# Patient Record
Sex: MALE | Race: OTHER | HISPANIC OR LATINO | URBAN - METROPOLITAN AREA
[De-identification: names, ages, dates, MRNs, and addresses within clinical notes are randomized per-mention and may not be internally consistent; named-entity substitution may affect disease eponyms.]

---

## 2017-02-08 ENCOUNTER — EMERGENCY (EMERGENCY)
Facility: HOSPITAL | Age: 27
LOS: 1 days | Discharge: HOME | End: 2017-02-08
Attending: EMERGENCY MEDICINE
Payer: SELF-PAY

## 2017-02-08 VITALS
RESPIRATION RATE: 19 BRPM | HEART RATE: 82 BPM | DIASTOLIC BLOOD PRESSURE: 70 MMHG | WEIGHT: 216.05 LBS | SYSTOLIC BLOOD PRESSURE: 122 MMHG | OXYGEN SATURATION: 98 % | TEMPERATURE: 99 F

## 2017-02-08 DIAGNOSIS — J40 BRONCHITIS, NOT SPECIFIED AS ACUTE OR CHRONIC: ICD-10-CM

## 2017-02-08 DIAGNOSIS — J18.9 PNEUMONIA, UNSPECIFIED ORGANISM: ICD-10-CM

## 2017-02-08 PROCEDURE — 94640 AIRWAY INHALATION TREATMENT: CPT

## 2017-02-08 PROCEDURE — 71046 X-RAY EXAM CHEST 2 VIEWS: CPT

## 2017-02-08 PROCEDURE — 99053 MED SERV 10PM-8AM 24 HR FAC: CPT

## 2017-02-08 PROCEDURE — 99284 EMERGENCY DEPT VISIT MOD MDM: CPT | Mod: 25

## 2017-02-08 PROCEDURE — 99283 EMERGENCY DEPT VISIT LOW MDM: CPT | Mod: 25

## 2017-02-08 PROCEDURE — 71020: CPT | Mod: 26

## 2017-02-08 RX ORDER — IPRATROPIUM/ALBUTEROL SULFATE 18-103MCG
3 AEROSOL WITH ADAPTER (GRAM) INHALATION ONCE
Qty: 0 | Refills: 0 | Status: COMPLETED | OUTPATIENT
Start: 2017-02-08 | End: 2017-02-08

## 2017-02-08 RX ORDER — ALBUTEROL 90 UG/1
0 AEROSOL, METERED ORAL
Qty: 0 | Refills: 0 | COMMUNITY

## 2017-02-08 RX ORDER — ALBUTEROL 90 UG/1
2 AEROSOL, METERED ORAL
Qty: 2 | Refills: 0 | OUTPATIENT
Start: 2017-02-08 | End: 2017-02-18

## 2017-02-08 RX ADMIN — Medication 3 MILLILITER(S): at 03:02

## 2017-02-08 RX ADMIN — Medication 60 MILLIGRAM(S): at 03:02

## 2017-02-08 NOTE — ED PROVIDER NOTE - OBJECTIVE STATEMENT
27 y/o M pt w/ no significant PMHx presents to the ED c/o chest congestion, wheezing and cough x3 weeks. Pt states that he went to his PMD and was given Prednisone and Zithromax; pt has been taking them the past 3 weeks to mild relief. Pt denies fever, headache, hemoptysis, diaphoresis, or any other complaints. NKDA.

## 2017-02-08 NOTE — ED PROVIDER NOTE - MEDICAL DECISION MAKING DETAILS
25 y/o M pt w/ bronchitis and PNA. Pt already completed course of steroids and Zithromax. Will give another dose of steroids and change antibiotic to Levaquin. Pt instructed to f/u w/ PMD and return to ED for worsening symptoms.

## 2017-02-08 NOTE — ED PROVIDER NOTE - NS ED MD SCRIBE ATTENDING SCRIBE SECTIONS
VITAL SIGNS( Pullset)/PAST MEDICAL/SURGICAL/SOCIAL HISTORY/HISTORY OF PRESENT ILLNESS/DISPOSITION/REVIEW OF SYSTEMS/PHYSICAL EXAM/HIV

## 2020-08-14 ENCOUNTER — EMERGENCY (EMERGENCY)
Facility: HOSPITAL | Age: 30
LOS: 1 days | Discharge: HOME | End: 2020-08-14
Attending: EMERGENCY MEDICINE
Payer: SELF-PAY

## 2020-08-14 VITALS
HEART RATE: 80 BPM | TEMPERATURE: 99 F | DIASTOLIC BLOOD PRESSURE: 84 MMHG | RESPIRATION RATE: 18 BRPM | WEIGHT: 239.42 LBS | OXYGEN SATURATION: 97 % | SYSTOLIC BLOOD PRESSURE: 148 MMHG | HEIGHT: 70.87 IN

## 2020-08-14 PROCEDURE — 70486 CT MAXILLOFACIAL W/O DYE: CPT | Mod: 26

## 2020-08-14 PROCEDURE — 99284 EMERGENCY DEPT VISIT MOD MDM: CPT | Mod: 25

## 2020-08-14 PROCEDURE — 70486 CT MAXILLOFACIAL W/O DYE: CPT

## 2020-08-14 PROCEDURE — 70450 CT HEAD/BRAIN W/O DYE: CPT | Mod: 26

## 2020-08-14 PROCEDURE — 70450 CT HEAD/BRAIN W/O DYE: CPT

## 2020-08-14 RX ORDER — OXYCODONE AND ACETAMINOPHEN 5; 325 MG/1; MG/1
1 TABLET ORAL ONCE
Refills: 0 | Status: DISCONTINUED | OUTPATIENT
Start: 2020-08-14 | End: 2020-08-14

## 2020-08-14 RX ADMIN — OXYCODONE AND ACETAMINOPHEN 1 TABLET(S): 5; 325 TABLET ORAL at 22:14

## 2020-08-14 NOTE — ED PROVIDER NOTE - NSFOLLOWUPINSTRUCTIONS_ED_ALL_ED_FT
Contusion    A contusion is a deep bruise. Contusions are the result of a blunt injury to tissues and muscle fibers under the skin. The skin overlying the contusion may turn blue, purple, or yellow. Symptoms also include pain and swelling in the injured area.    SEEK IMMEDIATE MEDICAL CARE IF YOU HAVE ANY OF THE FOLLOWING SYMPTOMS: severe pain, numbness, tingling, pain, weakness, worsening vision, trouble moving your eye or any other worrisome or concerning signs or symptoms.

## 2020-08-14 NOTE — ED PROVIDER NOTE - PROGRESS NOTE DETAILS
No corneal abrasion on exam with woods lamp and fluorescin stain.  CTs negative.  Will provide patient with analgesia and discharge.  Patient nontoxic and medically stable for discharge. Results discussed with patient. Return precautions provided and patient understands to return to the ED for concerning or worsening signs and symptoms. Instructed to follow up with primary care physician and agreeable. Patient's questions answered.

## 2020-08-14 NOTE — ED ADULT TRIAGE NOTE - CHIEF COMPLAINT QUOTE
SENT BY PMD FOR S/P BLUNT TRAUMA TO RIGHT EYE. PT REPORTS S/P GOT HIT IN RIGHT EYE BY ELBOW. + BLURRED VISION AND PAIN

## 2020-08-14 NOTE — ED PROVIDER NOTE - PATIENT PORTAL LINK FT
You can access the FollowMyHealth Patient Portal offered by [facility] by registering at the following website: [email or web address]. By joining [employer]’s FollowMyHealth portal, you will also be able to view your health information using other applications (apps) compatible with our system.

## 2020-08-14 NOTE — ED ADULT NURSE NOTE - OBJECTIVE STATEMENT
States he was accidentally elbowed playing basketball today ,c/o right eye pain and blurring of vision . States he was accidentally elbowed playing basketball today ,c/o right eye pain and blurring of vision .Right eye with subconjunctival hemorrhage .,upper eyelid with 1.5 cm laceration closed with hystacryl by Keyla ROBERT. With right ace orbital ecchymosis .

## 2020-08-14 NOTE — ED PROVIDER NOTE - ATTENDING CONTRIBUTION TO CARE
seen with acp  elbowed when playing basketball 1 hour pta  PE laceration to right corner of eye  visual acuity 20/30  no corneal abrasion  ct scan is negative  Agree with acps assessment hx and physical and disposition

## 2020-08-14 NOTE — ED PROVIDER NOTE - EYE, RIGHT
pupils equal, round, and reactive to light/Periorbital ecchymosis, soft tissue swelling to the lateral superior aspect of the R eye

## 2022-12-24 ENCOUNTER — EMERGENCY (EMERGENCY)
Facility: HOSPITAL | Age: 32
LOS: 1 days | Discharge: ANOTHER HOSPITAL | End: 2022-12-24
Attending: EMERGENCY MEDICINE
Payer: COMMERCIAL

## 2022-12-24 ENCOUNTER — EMERGENCY (EMERGENCY)
Facility: HOSPITAL | Age: 32
LOS: 1 days | Discharge: HOME | End: 2022-12-24
Attending: EMERGENCY MEDICINE
Payer: COMMERCIAL

## 2022-12-24 VITALS
WEIGHT: 227.96 LBS | HEART RATE: 80 BPM | TEMPERATURE: 99 F | OXYGEN SATURATION: 96 % | SYSTOLIC BLOOD PRESSURE: 102 MMHG | DIASTOLIC BLOOD PRESSURE: 56 MMHG | RESPIRATION RATE: 20 BRPM | HEIGHT: 72 IN

## 2022-12-24 VITALS
RESPIRATION RATE: 20 BRPM | WEIGHT: 227.96 LBS | TEMPERATURE: 97 F | HEART RATE: 68 BPM | SYSTOLIC BLOOD PRESSURE: 90 MMHG | DIASTOLIC BLOOD PRESSURE: 55 MMHG | OXYGEN SATURATION: 98 %

## 2022-12-24 PROBLEM — Z78.9 OTHER SPECIFIED HEALTH STATUS: Chronic | Status: ACTIVE | Noted: 2020-08-14

## 2022-12-24 LAB
ALBUMIN SERPL ELPH-MCNC: 4.2 G/DL — SIGNIFICANT CHANGE UP (ref 3.5–5)
ALBUMIN SERPL ELPH-MCNC: 4.5 G/DL — SIGNIFICANT CHANGE UP (ref 3.3–5)
ALP SERPL-CCNC: 43 U/L — SIGNIFICANT CHANGE UP (ref 40–120)
ALP SERPL-CCNC: 44 U/L — SIGNIFICANT CHANGE UP (ref 40–120)
ALT FLD-CCNC: 18 U/L — SIGNIFICANT CHANGE UP (ref 10–45)
ALT FLD-CCNC: 27 U/L DA — SIGNIFICANT CHANGE UP (ref 10–60)
ANION GAP SERPL CALC-SCNC: 13 MMOL/L — SIGNIFICANT CHANGE UP (ref 5–17)
ANION GAP SERPL CALC-SCNC: 7 MMOL/L — SIGNIFICANT CHANGE UP (ref 5–17)
APAP SERPL-MCNC: <2 UG/ML — LOW (ref 10–30)
APTT BLD: 28.5 SEC — SIGNIFICANT CHANGE UP (ref 27.5–35.5)
AST SERPL-CCNC: 20 U/L — SIGNIFICANT CHANGE UP (ref 10–40)
BASE EXCESS BLDV CALC-SCNC: 0.4 MMOL/L — SIGNIFICANT CHANGE UP (ref -2–3)
BASOPHILS # BLD AUTO: 0 K/UL — SIGNIFICANT CHANGE UP (ref 0–0.2)
BASOPHILS # BLD AUTO: 0.07 K/UL — SIGNIFICANT CHANGE UP (ref 0–0.2)
BASOPHILS NFR BLD AUTO: 0 % — SIGNIFICANT CHANGE UP (ref 0–2)
BASOPHILS NFR BLD AUTO: 0.9 % — SIGNIFICANT CHANGE UP (ref 0–2)
BILIRUB SERPL-MCNC: 0.7 MG/DL — SIGNIFICANT CHANGE UP (ref 0.2–1.2)
BUN SERPL-MCNC: 20 MG/DL — SIGNIFICANT CHANGE UP (ref 7–23)
BUN SERPL-MCNC: 21 MG/DL — HIGH (ref 7–18)
BURR CELLS BLD QL SMEAR: PRESENT — SIGNIFICANT CHANGE UP
CA-I SERPL-SCNC: 1.21 MMOL/L — SIGNIFICANT CHANGE UP (ref 1.15–1.33)
CALCIUM SERPL-MCNC: 9 MG/DL — SIGNIFICANT CHANGE UP (ref 8.4–10.5)
CALCIUM SERPL-MCNC: 9.1 MG/DL — SIGNIFICANT CHANGE UP (ref 8.4–10.5)
CHLORIDE BLDV-SCNC: 102 MMOL/L — SIGNIFICANT CHANGE UP (ref 96–108)
CHLORIDE SERPL-SCNC: 103 MMOL/L — SIGNIFICANT CHANGE UP (ref 96–108)
CHLORIDE SERPL-SCNC: 106 MMOL/L — SIGNIFICANT CHANGE UP (ref 96–108)
CO2 BLDV-SCNC: 28 MMOL/L — HIGH (ref 22–26)
CO2 SERPL-SCNC: 23 MMOL/L — SIGNIFICANT CHANGE UP (ref 22–31)
CO2 SERPL-SCNC: 26 MMOL/L — SIGNIFICANT CHANGE UP (ref 22–31)
CREAT SERPL-MCNC: 1.02 MG/DL — SIGNIFICANT CHANGE UP (ref 0.5–1.3)
CREAT SERPL-MCNC: 1.16 MG/DL — SIGNIFICANT CHANGE UP (ref 0.5–1.3)
EGFR: 85 ML/MIN/1.73M2 — SIGNIFICANT CHANGE UP
EGFR: 99 ML/MIN/1.73M2 — SIGNIFICANT CHANGE UP
EOSINOPHIL # BLD AUTO: 0 K/UL — SIGNIFICANT CHANGE UP (ref 0–0.5)
EOSINOPHIL NFR BLD AUTO: 0 % — SIGNIFICANT CHANGE UP (ref 0–6)
FLUAV AG NPH QL: SIGNIFICANT CHANGE UP
FLUBV AG NPH QL: SIGNIFICANT CHANGE UP
GAS PNL BLDV: 133 MMOL/L — LOW (ref 136–145)
GAS PNL BLDV: SIGNIFICANT CHANGE UP
GLUCOSE BLDV-MCNC: 123 MG/DL — HIGH (ref 70–99)
GLUCOSE SERPL-MCNC: 127 MG/DL — HIGH (ref 70–99)
GLUCOSE SERPL-MCNC: 142 MG/DL — HIGH (ref 70–99)
HCO3 BLDV-SCNC: 27 MMOL/L — SIGNIFICANT CHANGE UP (ref 22–29)
HCT VFR BLD CALC: 47.1 % — SIGNIFICANT CHANGE UP (ref 39–50)
HCT VFR BLD CALC: 47.9 % — SIGNIFICANT CHANGE UP (ref 39–50)
HCT VFR BLDA CALC: 50 % — SIGNIFICANT CHANGE UP (ref 39–51)
HGB BLD CALC-MCNC: 16.6 G/DL — SIGNIFICANT CHANGE UP (ref 12.6–17.4)
HGB BLD-MCNC: 16.4 G/DL — SIGNIFICANT CHANGE UP (ref 13–17)
HGB BLD-MCNC: 16.7 G/DL — SIGNIFICANT CHANGE UP (ref 13–17)
INR BLD: 1.25 RATIO — HIGH (ref 0.88–1.16)
LACTATE BLDV-MCNC: 2 MMOL/L — SIGNIFICANT CHANGE UP (ref 0.5–2)
LYMPHOCYTES # BLD AUTO: 0.32 K/UL — LOW (ref 1–3.3)
LYMPHOCYTES # BLD AUTO: 0.42 K/UL — LOW (ref 1–3.3)
LYMPHOCYTES # BLD AUTO: 4 % — LOW (ref 13–44)
LYMPHOCYTES # BLD AUTO: 5.1 % — LOW (ref 13–44)
MAGNESIUM SERPL-MCNC: 2 MG/DL — SIGNIFICANT CHANGE UP (ref 1.6–2.6)
MANUAL SMEAR VERIFICATION: SIGNIFICANT CHANGE UP
MCHC RBC-ENTMCNC: 31.3 PG — SIGNIFICANT CHANGE UP (ref 27–34)
MCHC RBC-ENTMCNC: 31.4 PG — SIGNIFICANT CHANGE UP (ref 27–34)
MCHC RBC-ENTMCNC: 34.8 GM/DL — SIGNIFICANT CHANGE UP (ref 32–36)
MCHC RBC-ENTMCNC: 34.9 GM/DL — SIGNIFICANT CHANGE UP (ref 32–36)
MCV RBC AUTO: 89.9 FL — SIGNIFICANT CHANGE UP (ref 80–100)
MCV RBC AUTO: 90 FL — SIGNIFICANT CHANGE UP (ref 80–100)
MONOCYTES # BLD AUTO: 0.48 K/UL — SIGNIFICANT CHANGE UP (ref 0–0.9)
MONOCYTES # BLD AUTO: 0.83 K/UL — SIGNIFICANT CHANGE UP (ref 0–0.9)
MONOCYTES NFR BLD AUTO: 10.2 % — SIGNIFICANT CHANGE UP (ref 2–14)
MONOCYTES NFR BLD AUTO: 6 % — SIGNIFICANT CHANGE UP (ref 2–14)
NEUTROPHILS # BLD AUTO: 6.5 K/UL — SIGNIFICANT CHANGE UP (ref 1.8–7.4)
NEUTROPHILS # BLD AUTO: 7.24 K/UL — SIGNIFICANT CHANGE UP (ref 1.8–7.4)
NEUTROPHILS NFR BLD AUTO: 75.4 % — SIGNIFICANT CHANGE UP (ref 43–77)
NEUTROPHILS NFR BLD AUTO: 81 % — HIGH (ref 43–77)
NEUTS BAND # BLD: 4.2 % — SIGNIFICANT CHANGE UP (ref 0–8)
NEUTS BAND # BLD: 9 % — HIGH (ref 0–8)
NRBC # BLD: 0 /100 — SIGNIFICANT CHANGE UP (ref 0–0)
PCO2 BLDV: 47 MMHG — SIGNIFICANT CHANGE UP (ref 42–55)
PH BLDV: 7.36 — SIGNIFICANT CHANGE UP (ref 7.32–7.43)
PHOSPHATE SERPL-MCNC: 2.8 MG/DL — SIGNIFICANT CHANGE UP (ref 2.5–4.5)
PLAT MORPH BLD: NORMAL — SIGNIFICANT CHANGE UP
PLATELET # BLD AUTO: 120 K/UL — LOW (ref 150–400)
PLATELET # BLD AUTO: 133 K/UL — LOW (ref 150–400)
PO2 BLDV: 38 MMHG — SIGNIFICANT CHANGE UP (ref 25–45)
POTASSIUM BLDV-SCNC: 4.2 MMOL/L — SIGNIFICANT CHANGE UP (ref 3.5–5.1)
POTASSIUM SERPL-MCNC: 4.4 MMOL/L — SIGNIFICANT CHANGE UP (ref 3.5–5.3)
POTASSIUM SERPL-MCNC: 4.7 MMOL/L — SIGNIFICANT CHANGE UP (ref 3.5–5.3)
POTASSIUM SERPL-SCNC: 4.4 MMOL/L — SIGNIFICANT CHANGE UP (ref 3.5–5.3)
POTASSIUM SERPL-SCNC: 4.7 MMOL/L — SIGNIFICANT CHANGE UP (ref 3.5–5.3)
PROT SERPL-MCNC: 7.3 G/DL — SIGNIFICANT CHANGE UP (ref 6–8.3)
PROTHROM AB SERPL-ACNC: 14.4 SEC — HIGH (ref 10.5–13.4)
RBC # BLD: 5.24 M/UL — SIGNIFICANT CHANGE UP (ref 4.2–5.8)
RBC # BLD: 5.32 M/UL — SIGNIFICANT CHANGE UP (ref 4.2–5.8)
RBC # FLD: 11.6 % — SIGNIFICANT CHANGE UP (ref 10.3–14.5)
RBC # FLD: 11.9 % — SIGNIFICANT CHANGE UP (ref 10.3–14.5)
RBC BLD AUTO: NORMAL — SIGNIFICANT CHANGE UP
RBC BLD AUTO: SIGNIFICANT CHANGE UP
SALICYLATES SERPL-MCNC: 9.9 MG/DL — SIGNIFICANT CHANGE UP (ref 2.8–20)
SAO2 % BLDV: 61.8 % — LOW (ref 67–88)
SARS-COV-2 RNA SPEC QL NAA+PROBE: SIGNIFICANT CHANGE UP
SODIUM SERPL-SCNC: 139 MMOL/L — SIGNIFICANT CHANGE UP (ref 135–145)
TROPONIN I, HIGH SENSITIVITY RESULT: 3.9 NG/L — SIGNIFICANT CHANGE UP
VARIANT LYMPHS # BLD: 4.2 % — SIGNIFICANT CHANGE UP (ref 0–6)
WBC # BLD: 8.04 K/UL — SIGNIFICANT CHANGE UP (ref 3.8–10.5)
WBC # BLD: 8.16 K/UL — SIGNIFICANT CHANGE UP (ref 3.8–10.5)
WBC # FLD AUTO: 8.04 K/UL — SIGNIFICANT CHANGE UP (ref 3.8–10.5)
WBC # FLD AUTO: 8.16 K/UL — SIGNIFICANT CHANGE UP (ref 3.8–10.5)

## 2022-12-24 PROCEDURE — 80053 COMPREHEN METABOLIC PANEL: CPT

## 2022-12-24 PROCEDURE — 84484 ASSAY OF TROPONIN QUANT: CPT

## 2022-12-24 PROCEDURE — 96374 THER/PROPH/DIAG INJ IV PUSH: CPT

## 2022-12-24 PROCEDURE — 83735 ASSAY OF MAGNESIUM: CPT

## 2022-12-24 PROCEDURE — 70450 CT HEAD/BRAIN W/O DYE: CPT | Mod: 26,MA

## 2022-12-24 PROCEDURE — 93005 ELECTROCARDIOGRAM TRACING: CPT

## 2022-12-24 PROCEDURE — 99220: CPT

## 2022-12-24 PROCEDURE — 36415 COLL VENOUS BLD VENIPUNCTURE: CPT

## 2022-12-24 PROCEDURE — 80307 DRUG TEST PRSMV CHEM ANLYZR: CPT

## 2022-12-24 PROCEDURE — 87637 SARSCOV2&INF A&B&RSV AMP PRB: CPT

## 2022-12-24 PROCEDURE — 99291 CRITICAL CARE FIRST HOUR: CPT

## 2022-12-24 PROCEDURE — 70450 CT HEAD/BRAIN W/O DYE: CPT | Mod: MA

## 2022-12-24 PROCEDURE — 93010 ELECTROCARDIOGRAM REPORT: CPT

## 2022-12-24 PROCEDURE — 99291 CRITICAL CARE FIRST HOUR: CPT | Mod: 25

## 2022-12-24 PROCEDURE — 84100 ASSAY OF PHOSPHORUS: CPT

## 2022-12-24 PROCEDURE — 85025 COMPLETE CBC W/AUTO DIFF WBC: CPT

## 2022-12-24 RX ORDER — METOCLOPRAMIDE HCL 10 MG
10 TABLET ORAL ONCE
Refills: 0 | Status: COMPLETED | OUTPATIENT
Start: 2022-12-24 | End: 2022-12-24

## 2022-12-24 RX ORDER — METOCLOPRAMIDE HCL 10 MG
10 TABLET ORAL ONCE
Refills: 0 | Status: DISCONTINUED | OUTPATIENT
Start: 2022-12-24 | End: 2022-12-24

## 2022-12-24 RX ORDER — MORPHINE SULFATE 50 MG/1
4 CAPSULE, EXTENDED RELEASE ORAL ONCE
Refills: 0 | Status: DISCONTINUED | OUTPATIENT
Start: 2022-12-24 | End: 2022-12-24

## 2022-12-24 RX ORDER — LIDOCAINE 4 G/100G
1 CREAM TOPICAL ONCE
Refills: 0 | Status: COMPLETED | OUTPATIENT
Start: 2022-12-24 | End: 2022-12-24

## 2022-12-24 RX ORDER — MAGNESIUM SULFATE 500 MG/ML
1 VIAL (ML) INJECTION ONCE
Refills: 0 | Status: COMPLETED | OUTPATIENT
Start: 2022-12-24 | End: 2022-12-24

## 2022-12-24 RX ORDER — ACETAMINOPHEN 500 MG
1000 TABLET ORAL ONCE
Refills: 0 | Status: COMPLETED | OUTPATIENT
Start: 2022-12-24 | End: 2022-12-24

## 2022-12-24 RX ADMIN — LIDOCAINE 1 PATCH: 4 CREAM TOPICAL at 19:30

## 2022-12-24 RX ADMIN — Medication 10 MILLIGRAM(S): at 19:30

## 2022-12-24 RX ADMIN — Medication 400 MILLIGRAM(S): at 17:45

## 2022-12-24 RX ADMIN — MORPHINE SULFATE 4 MILLIGRAM(S): 50 CAPSULE, EXTENDED RELEASE ORAL at 19:30

## 2022-12-24 RX ADMIN — Medication 100 GRAM(S): at 19:30

## 2022-12-24 NOTE — ED CDU PROVIDER INITIAL DAY NOTE - CROS ED CARDIOVAS ALL NEG
[phone] MOM CALLED AND WANTS TO KNOW HOW LONG SHE IS TO USE THE EYE DROPS? IT IS NOT ON THE RX THANK YOU   - - -

## 2022-12-24 NOTE — ED PROVIDER NOTE - PROGRESS NOTE DETAILS
Patient noted to have small subdural hemorrhage which in setting of his significant ASA and salicylate usage over the past few days I believe should be transferred to higher level of care for monitoring.  Will transfer [facility].

## 2022-12-24 NOTE — ED ADULT NURSE NOTE - OBJECTIVE STATEMENT
33 yo male with no significant PMH presents to the ED via EMS from [facility] complaining of a SDH. Patient reports that he woke up from a nap and was ambulating to the bathroom. Felt dizzy and lightheaded and sustained a fall. As per partner at bedside, reports that she heard him fall and went running to the bathroom and found him unconscious for about 1-2 minutes. Patient woke up and did not remember series of events. Reports that he has been taking advil for a back injury he sustained earlier this week from working out. Patient reports that today pain became unbearable and took a double dose of it. Was seen at [facility] and CT of head showed a 5mm SDH with no midline shift. Upon arrival, patient is A&Ox3, PALACIOS 5/5 strength in all 4 extremities. Neuro flow sheet started. Is well appearing at this time, lower L lip laceration noted due to fall--no bleeding at this time. Denies vision changes, chest pain, shortness of breath, abdominal pain, nausea, vomiting, diarrhea, fevers, chills, dysuria, hematuria, recent illness travel.

## 2022-12-24 NOTE — CONSULT NOTE ADULT - SUBJECTIVE AND OBJECTIVE BOX
32-year-old male, transferred to [facility] from [facility] for Acute small left tentorial leaflet subdural hemorrhage. initial presentation to outside hospital for syncopal episode. reports that for about the past week he has been having lower back pain radiating down his right leg after working out at the gym. he started taking one Aspirin every 4-6 hours over the counter and then doubled the aspirin yesterday. Earlier this morning as he was walking around the pain got worse and he suddenly felt lightheaded and dizzy and per his partner suddenly collapsed.  He did strike his head during this episode as well as his lip.  He was unconscious briefly on awakening he was back to baseline without any seizure activity. denies any preceding chest pains, palpitations or shortness of breath. He denies visual changes, numbness tingling or weakness.  He has no urinary or bowel incontinence or loss of function of his bowels or bladder.

## 2022-12-24 NOTE — ED PROVIDER NOTE - PHYSICAL EXAMINATION
Vitals: I have reviewed the patients vital signs  General: Well dressed, well appearing, no acute distress  HEENT: Atraumatic, normocephalic, airway patent  Eyes: EOMI, tracking appropriately  Neck: no tracheal deviation, no JVD  Chest/Lungs: no trauma, symmetric chest rise, speaking in complete sentences, no WOB  Heart: skin and extremities well perfused, regular rate and rhythm  Neuro: A+Ox3, CN II-XII intact, speech coherent and non dysarthric, gait deferred. Muscle strength at baseline in all extremities  Eyes: PERRL 4mm, EOMI, no conjunctival injection   MSK: strength at baseline in all extremities, no muscle wasting or atrophy  Skin: no cyanosis, no jaundice, no new emergent lesions

## 2022-12-24 NOTE — ED PROVIDER NOTE - NS ED ROS FT
Constitutional: (-) fever (-) vomiting  Eyes/ENT: (-) vision changes, (-) hearing changes  Cardiovascular: (-) chest pain, (-) wheezing  Respiratory: (-) cough, (-) shortness of breath  Gastrointestinal: (-) vomiting, (-) diarrhea, (-) abdominal pain  : (-) dysuria   Musculoskeletal: + back pain  Integumentary: (-) rash, (-) edema  Neurological: (-)loc  Allergic/Immunologic: (-) pruritus  Endocrine: No history of thyroid disease

## 2022-12-24 NOTE — CONSULT NOTE ADULT - ASSESSMENT
Luis Diego  32M PMHx daily ASA for back pain this past week, syncopal episode today, head strike no LOC. CT head 4pm at [facility] shows L tentorial thin SDH, 4hr repeat stable. Exam: intact  - Repeat CT at 8am  - If stable no contraindication to D/C w/ outpatient f/u

## 2022-12-24 NOTE — ED CDU PROVIDER DISPOSITION NOTE - NSFOLLOWUPINSTRUCTIONS_ED_ALL_ED_FT
1. follow up with your primary care doctor and neurosurgery regarding your stay in the ED  2. bring a copy of your results to your follow up appointment  3. take Tylenol as needed for back pain   4. if your symptoms worsen, persist, or if any new symptoms develop, or if you experience any signs of distress, return to the ED right away. 1. follow up with your primary care doctor and neurosurgery regarding your stay in the ED    Sam Verdugo)  Neurosurgery  [address] [place] [address]  Phone: [phone]    take keppra anti-seizure medication twice daily for one week    do not take aspirin     2. bring a copy of your results to your follow up appointment  3. take Tylenol as needed for back pain   4. if your symptoms worsen, persist, or if any new symptoms develop, or if you experience any signs of distress, return to the ED right away.

## 2022-12-24 NOTE — ED PROVIDER NOTE - OBJECTIVE STATEMENT
32-year-old male presenting complaining of syncopal episode.  He reports that for about the past week he has been having lower back pain radiating down his right leg.  He began taking first 500 mg of aspirin every 4-6 hours as well as magnesium salicylate 580 mg (also 4-6 hours) that he obtained over-the-counter for the first few days and then doubled that in the past 3 days.  He still having the pain.  Earlier this morning as he was walking around the pain got worse and he suddenly felt lightheaded and dizzy and per his partner suddenly collapsed.  He did strike his head during this episode as well as his lip.  He was unconscious briefly on awakening he was back to baseline without any seizure activity.  He was denying any preceding chest pains, palpitations or shortness of breath.  He is now endorsing pain in his lip as well as headache.  He denies visual changes, numbness tingling or weakness.  He has no urinary or bowel incontinence or loss of function of his bowels or bladder.

## 2022-12-24 NOTE — ED CDU PROVIDER INITIAL DAY NOTE - DETAILS
-subdural Hemorrhage:   -repeat CT head at 12hour param  -cardiac monitor  -echo  -vital signs  -frequent re-evaluations   -discussed with ED attending

## 2022-12-24 NOTE — ED CDU PROVIDER DISPOSITION NOTE - ATTENDING APP SHARED VISIT CONTRIBUTION OF CARE
Attending MD Ga:   I personally have seen and examined this patient.  Physician assistant note reviewed and agree on plan of care and except where noted.  See below for details.     Seen in CDU Enrique Calvillo 44    32M with no reported contributory PMH/PSH/Meds, no known drug allergies sent to the CDU after presenting to the ED with syncope found to have SDH.  Reports that last week lifted something which triggered lower back pain.  Reports associated RLE radiation.  Denies weakness. Denies loss of urinary or bowel continence, difficulty urinating.  Reports persistent mild HA.  Reports lower back pain, declines analgesia.  Denies chest pain, shortness of breath, abdominal pain, nausea, vomiting, diarrhea, urinary complaints. Denies tinnitus.    Exam:   General: NAD  HENT: head NCAT, airway patent  Eyes: PERRL, EOMI  Lungs: lungs CTAB with good inspiratory effort, no wheezing, no rhonchi, no rales  Cardiac: +S1S2, no obvious m/r/g  GI: abdomen soft with +BS, NT, ND  : no CVAT  MSK: FROM at neck, no tenderness to midline palpation, +tenderness to palpation across lumbar spine, +R SLR  Neuro: moving all extremities spontaneously, sensory grossly intact, no gross neuro deficits  Psych: normal mood and affect     A/P: 32M with lower back pain, possible ASA overuse, syncope with +SDH, serial CTHs nonactionable, evaluated by neurosx, recommend dc with Keppra 500 BID x 7 days, NO ASA, f/u with Dr. Verdugo in 1-2 weeks.  Will also give Spine number for spine follow up.  Stable for discharge.  Labs and radiology reviewed with patient.  Follow up instructions given, importance of follow up emphasized, return to ED parameters reviewed and patient verbalized understanding.  All questions answered, all concerns addressed.

## 2022-12-24 NOTE — ED CDU PROVIDER DISPOSITION NOTE - CLINICAL COURSE
32-year-old male, transferred to [facility] from [facility] for Acute small left tentorial leaflet subdural hemorrhage. initial presentation to outside hospital for syncopal episode. reports that for about the past week he has been having lower back pain radiating down his right leg.  He began taking first 500 mg of aspirin every 4-6 hours as well as magnesium salicylate 580 mg (also 4-6 hours) that he obtained over-the-counter for the first few days and then doubled that in the past 3 days.  Earlier this morning as he was walking around the pain got worse and he suddenly felt lightheaded and dizzy and per his partner suddenly collapsed.  He did strike his head during this episode as well as his lip.  He was unconscious briefly on awakening he was back to baseline without any seizure activity. denies any preceding chest pains, palpitations or shortness of breath. He denies visual changes, numbness tingling or weakness.  He has no urinary or bowel incontinence or loss of function of his bowels or bladder.  	At OSH had blood work showing salicylate level of only 9.9. imaging showed Acute small left tentorial leaflet subdural hemorrhage. Sent over for neuro surg eval. Only c/o mild low back pain, no urinary sx, bowel/bladder dysfunction, fever. Able to ambulate without difficulty.   ED course reviewed. labs and imaging done in ED. CT head showing minimal decrease in subdural hemorrhage. VSS in ED. neurosurgery consult appreciated. patient sent to cdu for repeat imaging in the AM as well as echo. will continue to monitor.    cdu course... 32-year-old male, [facility] small left tentorial leaflet subdural hemorrhage. initial presentation to outside hospital for syncopal episode. reports that for about the past week he has been having lower back pain radiating down his right leg after working out at the gym. he started taking one Aspirin every 4-6 hours over the counter and then doubled the aspirin yesterday.   Earlier this morning as he was walking around the pain got worse and he suddenly felt lightheaded and dizzy and per his partner suddenly collapsed.  He did strike his head during this episode as well as his lip.  He was unconscious briefly on awakening he was back to baseline without any seizure activity. denies any preceding chest pains, palpitations or shortness of breath. He denies visual changes, numbness tingling or weakness.  He has no urinary or bowel incontinence or loss of function of his bowels or bladder.  	At OSH had blood work showing salicylate level of only 9.9. imaging showed Acute small left tentorial leaflet subdural hemorrhage. Sent over for neuro surg eval. Only c/o mild low back pain, no urinary sx, bowel/bladder dysfunction, fever. Able to ambulate without difficulty.   ED course reviewed. labs and imaging done in ED. CT head showing minimal decrease in subdural hemorrhage. VSS in ED. neurosurgery consult appreciated. patient sent to cdu for repeat imaging in the AM as well as echo. will continue to monitor.    cdu course...

## 2022-12-24 NOTE — ED ADULT TRIAGE NOTE - CHIEF COMPLAINT QUOTE
syncopal episode at 1330 pm in bathroom , - at present axox3 , walked in with c/o lower  lip laceration .

## 2022-12-24 NOTE — ED CDU PROVIDER INITIAL DAY NOTE - OBJECTIVE STATEMENT
32-year-old male, transferred to [facility] from [facility] for Acute small left tentorial leaflet subdural hemorrhage. initial presentation to outside hospital for syncopal episode. reports that for about the past week he has been having lower back pain radiating down his right leg.  He began taking first 500 mg of aspirin every 4-6 hours as well as magnesium salicylate 580 mg (also 4-6 hours) that he obtained over-the-counter for the first few days and then doubled that in the past 3 days.  Earlier this morning as he was walking around the pain got worse and he suddenly felt lightheaded and dizzy and per his partner suddenly collapsed.  He did strike his head during this episode as well as his lip.  He was unconscious briefly on awakening he was back to baseline without any seizure activity. denies any preceding chest pains, palpitations or shortness of breath. He denies visual changes, numbness tingling or weakness.  He has no urinary or bowel incontinence or loss of function of his bowels or bladder.  At OSH had blood work showing salicylate level of only 9.9. imaging showed Acute small left tentorial leaflet subdural hemorrhage. Sent over for neuro surg eval. Only c/o mild low back pain, no urinary sx, bowel/bladder dysfunction, fever. Able to ambulate without difficulty.   ED course reviewed. labs and imaging done in ED. CT head showing minimal decrease in subdural hemorrhage. VSS in ED. neurosurgery consult appreciated. patient sent to cdu for repeat imaging in the AM as well as echo. will continue to monitor. 32-year-old male, transferred to [facility] from [facility] for Acute small left tentorial leaflet subdural hemorrhage. initial presentation to outside hospital for syncopal episode. reports that for about the past week he has been having lower back pain radiating down his right leg after working out at the gym. he started taking one Aspirin every 4-6 hours over the counter and then doubled the aspirin yesterday. Earlier this morning as he was walking around the pain got worse and he suddenly felt lightheaded and dizzy and per his partner suddenly collapsed.  He did strike his head during this episode as well as his lip.  He was unconscious briefly on awakening he was back to baseline without any seizure activity. denies any preceding chest pains, palpitations or shortness of breath. He denies visual changes, numbness tingling or weakness.  He has no urinary or bowel incontinence or loss of function of his bowels or bladder.  At OSH had blood work showing salicylate level of only 9.9. imaging showed Acute small left tentorial leaflet subdural hemorrhage. Sent over for neuro surg eval. Only c/o mild low back pain, no urinary sx, bowel/bladder dysfunction, fever. Able to ambulate without difficulty.   ED course reviewed. labs and imaging done in ED. CT head showing minimal decrease in subdural hemorrhage. VSS in ED. neurosurgery consult appreciated. patient sent to cdu for repeat imaging in the AM as well as echo. will continue to monitor.

## 2022-12-24 NOTE — ED CDU PROVIDER DISPOSITION NOTE - PATIENT PORTAL LINK FT
You can access the FollowMyHealth Patient Portal offered by [facility] by registering at the following website: [email or web address]. By joining [employer]’s FollowMyHealth portal, you will also be able to view your health information using other applications (apps) compatible with our system.

## 2022-12-24 NOTE — ED PROVIDER NOTE - CLINICAL SUMMARY MEDICAL DECISION MAKING FREE TEXT BOX
Patient with known small subdural, scan 2 hours ago, on aspirin. 2/2 syncope with fall and head strike. Here borderline fever without clear infectious etiology will rvp. Neuro intact. Will serial scan, repeat bloodwork, reverse asa as needed. ASA level not dangerously high will check for acidosis to ensure no need for bicarb. Dispo pending.

## 2022-12-24 NOTE — ED PROVIDER NOTE - ATTENDING CONTRIBUTION TO CARE
Trace Craven MD, FACEP: In this physician's medical judgement based on clinical history and physical exam: Signs and symptoms lead to differential diagnoses which include but are not limited to syncope with resultant sdh, patient has preceding symptoms of lightheadedness and light. + right frontal head strike and lip swelling from trauma.  will get iv, cbc, cmp, ekg, CE within normal limits at VS, cardiac monitoring for 2 hours for dysrhythmia and reassess.   repeat ct stable, neurosurgery attending requesting 12 hour interval scan  will obs  will obtain echo tte as well  Patient endorsed to the observation PA at the time of observation order placement.  Based on patient's history and physical exam, as well as the results of today's workup, I feel that patient warrants observation in the hospital for further workup/evaluation and continued management. I discussed the findings of today's workup with the patient and addressed the patient's questions and concerns. The patient was agreeable with observation. Our team spoke with the CDU receiving team who accepted the patient for observation and subsequently took over the patient's care at the time of order placement for observation.

## 2022-12-24 NOTE — ED PROVIDER NOTE - CLINICAL SUMMARY MEDICAL DECISION MAKING FREE TEXT BOX
Patient presenting with syncopal episode.  EKG shows deep Q waves inferiorly which in the setting of syncope is concerning for possible underlying cardiac issue.  He also has been taking significant amounts of salicylates over the past week so we will send VBG along with salicylate and Tylenol level to evaluate for possible toxidrome.  Furthermore we will CT patient's head to evaluate for possible bleed in the setting of the injury with significant salicylates on board.

## 2022-12-24 NOTE — ED PROVIDER NOTE - OBJECTIVE STATEMENT
32-year-old male presenting complaining of syncopal episode.  He reports that for about the past week he has been having lower back pain radiating down his right leg.  He began taking first 500 mg of aspirin every 4-6 hours as well as magnesium salicylate 580 mg (also 4-6 hours) that he obtained over-the-counter for the first few days and then doubled that in the past 3 days.  He still having the pain.  Earlier this morning as he was walking around the pain got worse and he suddenly felt lightheaded and dizzy and per his partner suddenly collapsed.  He did strike his head during this episode as well as his lip.  He was unconscious briefly on awakening he was back to baseline without any seizure activity.  He was denying any preceding chest pains, palpitations or shortness of breath.  He is now endorsing pain in his lip as well as headache.  He denies visual changes, numbness tingling or weakness.  He has no urinary or bowel incontinence or loss of function of his bowels or bladder.    At OSH had bloodwork showing salicylate level of only 9.9. Acute small left tentorial leaflet subdural hemorrhage. Sent over for NSRASHEED eval. Only c/o mild low back pain, no urinary sx, bowel/bladder dysfunction, fever. Able to ambulate without difficutly. Has had syncope previously similar to this episode.

## 2022-12-24 NOTE — ED CDU PROVIDER DISPOSITION NOTE - CARE PROVIDER_API CALL
Sam Verdugo)  Neurosurgery  [address] [place] [address]  Phone: [phone]  Fax: [phone]  Follow Up Time:

## 2022-12-24 NOTE — ED CDU PROVIDER INITIAL DAY NOTE - NS ED ATTENDING STATEMENT MOD
I have seen and examined this patient and fully participated in the care of this patient as the teaching attending.  The service was shared with the SILVIA.  I reviewed and verified the documentation and independently performed the documented:

## 2022-12-24 NOTE — ED PROVIDER NOTE - PHYSICAL EXAMINATION
Exam:  General: Patient well appearing, vital signs within normal limits  HENT: airway patent with moist mucous membranes, small 1mm laceration to R lower lip with lip edema  Eye: pupils equal round and reactive  Cardiac: RRR S1/S2 with strong peripheral pulses  Respiratory: lungs clear without respiratory distress  GI: abdomen soft, non tender, non distended  Neuro: no gross neurologic deficits  Skin: warm, well perfused  Psych: normal mood and affect

## 2022-12-24 NOTE — ED ADULT NURSE NOTE - NSIMPLEMENTINTERV_GEN_ALL_ED
Implemented All Fall Risk Interventions:  [place] to call system. Call bell, personal items and telephone within reach. Instruct patient to call for assistance. Room bathroom lighting operational. Non-slip footwear when patient is off stretcher. Physically safe environment: no spills, clutter or unnecessary equipment. Stretcher in lowest position, wheels locked, appropriate side rails in place. Provide visual cue, wrist band, yellow gown, etc. Monitor gait and stability. Monitor for mental status changes and reorient to person, place, and time. Review medications for side effects contributing to fall risk. Reinforce activity limits and safety measures with patient and family.

## 2022-12-24 NOTE — ED CDU PROVIDER INITIAL DAY NOTE - ATTENDING CONTRIBUTION TO CARE
***Trace Craven MD***  I have personally performed a face to face diagnostic evaluation on this patient.  I have reviewed the ACP note and agree with the history, exam, and plan of care, except as noted. Patient will be reassessed and discussed with AM/PM CDU/FT MD who will follow up on labs, analgesia, imaging and disposition as clinically indicated. Please see emergency department provider note.

## 2022-12-25 VITALS
HEART RATE: 70 BPM | DIASTOLIC BLOOD PRESSURE: 68 MMHG | TEMPERATURE: 98 F | RESPIRATION RATE: 16 BRPM | SYSTOLIC BLOOD PRESSURE: 121 MMHG | OXYGEN SATURATION: 96 %

## 2022-12-25 PROCEDURE — 83735 ASSAY OF MAGNESIUM: CPT

## 2022-12-25 PROCEDURE — 85730 THROMBOPLASTIN TIME PARTIAL: CPT

## 2022-12-25 PROCEDURE — G0378: CPT

## 2022-12-25 PROCEDURE — 85025 COMPLETE CBC W/AUTO DIFF WBC: CPT

## 2022-12-25 PROCEDURE — 93306 TTE W/DOPPLER COMPLETE: CPT | Mod: 26

## 2022-12-25 PROCEDURE — 85018 HEMOGLOBIN: CPT

## 2022-12-25 PROCEDURE — 93005 ELECTROCARDIOGRAM TRACING: CPT

## 2022-12-25 PROCEDURE — 93306 TTE W/DOPPLER COMPLETE: CPT

## 2022-12-25 PROCEDURE — 84295 ASSAY OF SERUM SODIUM: CPT

## 2022-12-25 PROCEDURE — 80053 COMPREHEN METABOLIC PANEL: CPT

## 2022-12-25 PROCEDURE — 70450 CT HEAD/BRAIN W/O DYE: CPT | Mod: 26,MA

## 2022-12-25 PROCEDURE — 93356 MYOCRD STRAIN IMG SPCKL TRCK: CPT

## 2022-12-25 PROCEDURE — 82435 ASSAY OF BLOOD CHLORIDE: CPT

## 2022-12-25 PROCEDURE — 99217: CPT

## 2022-12-25 PROCEDURE — 82947 ASSAY GLUCOSE BLOOD QUANT: CPT

## 2022-12-25 PROCEDURE — 83605 ASSAY OF LACTIC ACID: CPT

## 2022-12-25 PROCEDURE — 82330 ASSAY OF CALCIUM: CPT

## 2022-12-25 PROCEDURE — 96375 TX/PRO/DX INJ NEW DRUG ADDON: CPT | Mod: XU

## 2022-12-25 PROCEDURE — 99285 EMERGENCY DEPT VISIT HI MDM: CPT | Mod: 25

## 2022-12-25 PROCEDURE — 85610 PROTHROMBIN TIME: CPT

## 2022-12-25 PROCEDURE — 70450 CT HEAD/BRAIN W/O DYE: CPT | Mod: MA

## 2022-12-25 PROCEDURE — 36415 COLL VENOUS BLD VENIPUNCTURE: CPT

## 2022-12-25 PROCEDURE — 96374 THER/PROPH/DIAG INJ IV PUSH: CPT | Mod: XU

## 2022-12-25 PROCEDURE — 84132 ASSAY OF SERUM POTASSIUM: CPT

## 2022-12-25 PROCEDURE — 82803 BLOOD GASES ANY COMBINATION: CPT

## 2022-12-25 PROCEDURE — 85014 HEMATOCRIT: CPT

## 2022-12-25 RX ORDER — ACETAMINOPHEN 500 MG
975 TABLET ORAL ONCE
Refills: 0 | Status: COMPLETED | OUTPATIENT
Start: 2022-12-25 | End: 2022-12-25

## 2022-12-25 RX ORDER — LEVETIRACETAM 250 MG/1
500 TABLET, FILM COATED ORAL ONCE
Refills: 0 | Status: COMPLETED | OUTPATIENT
Start: 2022-12-25 | End: 2022-12-25

## 2022-12-25 RX ORDER — LEVETIRACETAM 250 MG/1
1 TABLET, FILM COATED ORAL
Qty: 14 | Refills: 0
Start: 2022-12-25 | End: 2022-12-31

## 2022-12-25 RX ADMIN — Medication 975 MILLIGRAM(S): at 06:21

## 2022-12-25 RX ADMIN — LEVETIRACETAM 500 MILLIGRAM(S): 250 TABLET, FILM COATED ORAL at 11:31

## 2022-12-25 NOTE — ED CDU PROVIDER SUBSEQUENT DAY NOTE - PROGRESS NOTE DETAILS
RASHIDA Colón: Patient seen at bedside in NAD.  VSS. NSR on tele. Patient resting comfortably but reports lower back discomfort after lifting from the gym and mild headache. does not want medication at this time Attending MD Ga: Echo at bedside RASHIDA Colón: seen by neurosurg oma who recommended keppra 500mg BID x1 week and outpt f/u. will give 1st dose now. pending TTE result

## 2022-12-25 NOTE — ED CDU PROVIDER SUBSEQUENT DAY NOTE - HISTORY
Anne FIELD: patient seen and evaluated at bedside. offers no complaints at this time. resting comfortably. VSS. pending CT head in the AM. will continue to monitor.

## 2022-12-25 NOTE — ED CDU PROVIDER SUBSEQUENT DAY NOTE - ATTENDING APP SHARED VISIT CONTRIBUTION OF CARE
Attending MD Ga:   I personally have seen and examined this patient.  Physician assistant note reviewed and agree on plan of care and except where noted.  See below for details.     Seen in CDU [facility] 44    32M with no reported contributory PMH/PSH/Meds, no known drug allergies sent to the CDU after presenting to the ED with syncope found to have SDH.  Reports that last week lifted something which triggered lower back pain.  Reports associated RLE radiation.  Denies weakness. Denies loss of urinary or bowel continence, difficulty urinating.  Reports persistent mild HA.  Reports lower back pain, declines analgesia.  Denies chest pain, shortness of breath, abdominal pain, nausea, vomiting, diarrhea, urinary complaints. Denies tinnitus.    Exam:   General: NAD  HENT: head NCAT, airway patent  Eyes: PERRL, EOMI  Lungs: lungs CTAB with good inspiratory effort, no wheezing, no rhonchi, no rales  Cardiac: +S1S2, no obvious m/r/g  GI: abdomen soft with +BS, NT, ND  : no CVAT  MSK: FROM at neck, no tenderness to midline palpation, +tenderness to palpation across lumbar spine, +R SLR  Neuro: moving all extremities spontaneously, sensory grossly intact, no gross neuro deficits  Psych: normal mood and affect     A/P: 32M with lower back pain, possible ASA overuse, syncope with +SDH, pending a repeat CTH, pending Echo, declined analgesia for lower back pain, will await

## 2022-12-25 NOTE — PROGRESS NOTE ADULT - ASSESSMENT
Luis Diego  32M PMHx daily ASA for back pain this past week, syncopal episode today, head strike no LOC. CT head 4pm at [facility] shows L tentorial thin SDH, 4hr repeat stable. Exam: intact    - Repeat CT this AM shows decreased hemorrhage   - Can DC with outpatient FU in 1-2 weeks with Dr. Sam Verdugo  - Keppra 500 BID for total of 7 days  - Should not take anymore ASA

## 2022-12-25 NOTE — ED ADULT NURSE REASSESSMENT NOTE - NS ED NURSE REASSESS COMMENT FT1
report received from DORY Muñoz. pt reports to ED c/o of subdural bleed. Pt Axox4, gross neuro intact, PERRL 4mm.  Pt placed in position of comfort. Pt educated on call bell system and provided call bell. Bed in lowest position, wheels locked, appropriate side rails raised. Pt denies needs at this time.
Received pt From DORY Han, Pt aox3, comfort and safety maintained, pt in observation for subdural hematoma, Vital signs stable, pain management provided, pt pending echo,  IV catheter in place with no signs of discomfort or irritation, Will continue with the plan of care.
Pt received D/C instructions, pt verbalized understanding, IV catheter removed

## 2022-12-25 NOTE — PROGRESS NOTE ADULT - SUBJECTIVE AND OBJECTIVE BOX
Patient seen and examined at bedside.    --Anticoagulation--    T(C): 36.8 (12-25-22 @ 07:20), Max: 37.7 (12-24-22 @ 18:50)  HR: 77 (12-25-22 @ 07:20) (71 - 81)  BP: 122/74 (12-25-22 @ 07:20) (102/56 - 126/67)  RR: 18 (12-25-22 @ 07:20) (17 - 21)  SpO2: 97% (12-25-22 @ 07:20) (96% - 99%)  Wt(kg): --    Exam: AO3, PERRL, EOMI, no drift/droop, PALACIOS 5/5, SILT

## 2022-12-27 PROBLEM — Z00.00 ENCOUNTER FOR PREVENTIVE HEALTH EXAMINATION: Status: ACTIVE | Noted: 2022-12-27

## 2023-01-04 ENCOUNTER — APPOINTMENT (OUTPATIENT)
Dept: NEUROSURGERY | Facility: CLINIC | Age: 33
End: 2023-01-04
Payer: COMMERCIAL

## 2023-01-04 ENCOUNTER — NON-APPOINTMENT (OUTPATIENT)
Age: 33
End: 2023-01-04

## 2023-01-04 PROCEDURE — 99211 OFF/OP EST MAY X REQ PHY/QHP: CPT

## 2023-01-04 NOTE — ASSESSMENT
[FreeTextEntry1] : Plan: \par \par No further neurosurgical intervention indicated\par Can call in 6 weeks if still displays post-concussive symptoms. \par \par Patient seen and examined with attending Dr. Verdugo\par Spent a total of 60 minutes with patient.

## 2023-01-04 NOTE — HISTORY OF PRESENT ILLNESS
[FreeTextEntry1] : 32-year-old male, transferred to [facility] from [facility] for Acute small left tentorial leaflet subdural hemorrhage. initial presentation to outside hospital for syncopal episode. reports that for about the past week he has been having lower back pain radiating down his right leg after working out at the gym. he started taking one Aspirin every 4-6 hours over the counter and then doubled the aspirin yesterday. Earlier this morning as he was walking around the pain got worse and he suddenly felt lightheaded and dizzy and per his partner suddenly collapsed.  He did strike his head during this episode as well as his lip.  He was unconscious briefly on awakening he was back to baseline without any seizure activity. denies any preceding chest pains, palpitations or shortness of breath. He denies visual changes, numbness tingling or weakness.  He has no urinary or bowel incontinence or loss of function of his bowels or bladder.\par \par CT scan in ED stable. Discharged home.\par \par Went back to work without issue, only reports feeling tired and a little foggy, but able to complete work.\par \par Neuro Exam:\par \par Intact

## 2023-09-22 NOTE — ED ADULT TRIAGE NOTE - ESI TRIAGE ACUITY LEVEL, MLM
-- DO NOT REPLY / DO NOT REPLY ALL --  -- Message is from [facility] [facility] [employer] ([facility]) --    General Patient Message: Patients  called again and would like a call back regarding PCP contacting hospital to have patient sent home. Please contact caller.      Caller Information       Type Contact Phone/Fax    09/21/2023 03:15 PM CDT Phone (Incoming) YOHAN JOHNSON (Emergency Contact) [phone]    09/22/2023 08:23 AM CDT Phone (Incoming) Darlyn Johnson (Self) [phone] (H)        Alternative phone number: [phone]    Can a detailed message be left? Yes    Message Turnaround:     Is it Working Hours? Yes - Working Hours     [place]:    Please give this turnaround time to the caller:   \"This message will be sent to [state Provider's name]. The clinical team will fulfill your request as soon as they review your message.\"                
-- DO NOT REPLY / DO NOT REPLY ALL --  -- Message is from [facility] [facility] [employer] ([facility]) --    General Patient Message: Patients  would like a call back to discuss getting the patient out of [facility].     Caller Information       Type Contact Phone/Fax    09/21/2023 03:15 PM CDT Phone (Incoming) YOHAN NAJERA (Emergency Contact) [phone]        Alternative phone number: n/a     Can a detailed message be left? Yes    Message Turnaround:     Is it Working Hours? Yes - Working Hours     [place]:    Please give this turnaround time to the caller:   \"This message will be sent to [state Provider's name]. The clinical team will fulfill your request as soon as they review your message.\"                
ROSY ONLY- Pt's spouse says that pt was admitted to [facility] in [place] for too much Digoxin in her system-    They were suppose to take it out of her & send her home.     Next thing he knows she is in a \"drug induced coma\"  He told them not to \"put a tube down her throat and put a tube down her throat\"   He wanted  to call and tell them to send her home.   I explained that Dr. Cabrera cannot do that.   Spouse seemed unsure if that was truth-    Spouse a said he is going to call [facility] & tell them to \"cut them off\"   
3